# Patient Record
Sex: MALE | Race: WHITE
[De-identification: names, ages, dates, MRNs, and addresses within clinical notes are randomized per-mention and may not be internally consistent; named-entity substitution may affect disease eponyms.]

---

## 2018-03-22 ENCOUNTER — HOSPITAL ENCOUNTER (EMERGENCY)
Dept: HOSPITAL 25 - UCEAST | Age: 28
Discharge: HOME | End: 2018-03-22
Payer: COMMERCIAL

## 2018-03-22 VITALS — DIASTOLIC BLOOD PRESSURE: 84 MMHG | SYSTOLIC BLOOD PRESSURE: 149 MMHG

## 2018-03-22 DIAGNOSIS — J32.9: Primary | ICD-10-CM

## 2018-03-22 DIAGNOSIS — R03.0: ICD-10-CM

## 2018-03-22 DIAGNOSIS — Z88.3: ICD-10-CM

## 2018-03-22 PROCEDURE — G0463 HOSPITAL OUTPT CLINIC VISIT: HCPCS

## 2018-03-22 PROCEDURE — 99212 OFFICE O/P EST SF 10 MIN: CPT

## 2018-03-22 PROCEDURE — 87651 STREP A DNA AMP PROBE: CPT

## 2018-03-22 NOTE — UC
Fidel COX Gabriel, scribed for Maximiliano Fields MD on 03/22/18 at 1613 .





Throat Pain/Nasal [place] HPI





- HPI Summary


HPI Summary: 





This patient is a 27 year old M [place] [facility] [place] chief complaint of 

sinus pressure since that began earlier this week. The patient rates the pain 3/

10 in severity. Patient reports laryngitis, cough, sneezing, diaphoresis, and 

rhinorrhea. Pt has had exposure to strep throat. 





- History of Current Complaint


Chief Complaint: UCGeneralIllness


Stated Complaint: SINUS CONGESTION


Hx Obtained From: Patient


Onset/Duration: Still Present


Severity: Mild


Pain Intensity: 3


Pain Scale Used: 0-10 Numeric


Associated Signs & Symptoms: Positive: Other - facial pressure, laryngitis, 

cough, sneezing, diaphoresis, rhinorrhea,





- Allergies/Home Medications


Allergies/Adverse Reactions: 


 Allergies











Allergy/AdvReac Type Severity Reaction Status Date / Time


 


amoxicillin Allergy  Rash Verified 03/22/18 16:02


 


[facility] All[facility]  Rash Verified 03/22/18 16:02














PMH/Surg Hx/FS Hx/Imm Hx


Other History Of: 


   Negative For: Hepatitis C, Anticoagulant Therapy





- Surgical History


Surgical History: Yes


Surgery Procedure, Year, and Place: LIGAMENT REPAIR RIGHT THUMB 2006





- Family History


Known Family History: Positive: Cardiac Disease, Hypertension





- Social History


Alcohol Use: Occasionally


Substance Use Type: None


Smoking Status (MU): Never Smoked Tobacco





Review of Systems


Constitutional: Other - diaphoresis


ENT: Nasal Discharge, Sinus Pain/Tenderness, Other - laryngitis and sneezing


Respiratory: Cough


All Other Systems Reviewed And Are Negative: Yes





Physical Exam





- Summary


Physical Exam Summary: 





VITAL SIGNS: Reviewed.


GENERAL:  Patient is a well developed and nourished M who is lying comfortable 

in the stretcher.  Patient is not in any acute respiratory distress.


HEAD AND FACE: Normocephalic


EYES: PERRLA, EOMI x 2.


EARS: Hearing grossly intact.


MOUTH: Oropharynx within normal limits.


NECK: Supple, trachea is midline, no adenopathy, no JVD, no carotid bruit.


CHEST: Symmetric, no tenderness at palpation


LUNGS: Clear to auscultation bilaterally. No wheezing or crackles.


CVS: Regular rate and rhythm, S1 and S2 present, no murmurs or gallops 

appreciated.


ABDOMEN: Soft, non-tender. Bowel sounds are normal. No abdominal abnormal 

pulsations.


EXTREMITIES: Full ROM in all major joints, no edema, no cyanosis or clubbing.


NEURO: Alert and oriented x 3. No acute neurological deficits. Speech is normal 

and follows commands.


SKIN: Dry and warm





Triage Information Reviewed: Yes


Vital Signs: 


 Initial Vital Signs











Temp  97.2 F   03/22/18 15:57


 


Pulse  82   03/22/18 15:57


 


Resp  20   03/22/18 15:57


 


BP  149/84   03/22/18 15:57


 


Pulse Ox  100   03/22/18 15:57











Vital Signs Reviewed: Yes





Throat Pain/Nasal Course/Dx





- Course


Assessment/Plan: This patient is a 27 year old M presenting to [facility] [place] with [place] 

chief complaint of sinus pressure since that began earlier this week. The 

patient rates the pain 3/10 in severity. Patient reports laryngitis, cough, 

sneezing, diaphoresis, and rhinorrhea. Pt has had exposure to strep throat.  

The patient was negative for strep throat.  I discussed all the findings and 

test results with the patient. Patient was instructed to return to the urgent 

care or go to ER immediately if any of the symptoms return or worsens. Plan of 

care was discussed with the patient, and patient understands and agrees. All 

questions were answered to patient satisfaction. There were no further 

complaints or concerns.





- Differential Dx/Diagnosis


Provider Diagnoses: Sinusitis.  Elevated blood pressure without a previous 

diagnoses of hypertension





Discharge





- Sign-Out/Discharge


Documenting (check all that apply): Discharge





- Discharge Plan


Condition: Stable


Disposition: HOME


Prescriptions: 


Azithromyxin EDWARDO (NF) [Z-Edwardo (Zithromax) 250 mg tabs #6] 2 tab PO .TODAY, THEN 

1 DAILY #6 tab


Patient Education Materials:  Sinusitis (ED)


Referrals: 


Blake Ramírez MD [Primary Care Provider] - 


Additional Instructions: 


Take medications as instructed


Increase your fluid intake


Return to the  if symptoms worsen


Your blood pressure was elevated during today's visit. Please follow up with 

your primary care provider in 1-2 weeks. 





- Billing Disposition and Condition


Condition: STABLE


Disposition: HOME





The documentation as recorded by the Fidel green Gabriel accurately reflects 

the service I personally performed and the decisions made by me, Maximiliano Fields MD.